# Patient Record
Sex: FEMALE | Race: WHITE | URBAN - METROPOLITAN AREA
[De-identification: names, ages, dates, MRNs, and addresses within clinical notes are randomized per-mention and may not be internally consistent; named-entity substitution may affect disease eponyms.]

---

## 2018-09-04 ENCOUNTER — OFFICE VISIT CONVERTED (OUTPATIENT)
Dept: ONCOLOGY | Facility: HOSPITAL | Age: 68
End: 2018-09-04
Attending: INTERNAL MEDICINE

## 2018-10-23 ENCOUNTER — OFFICE VISIT CONVERTED (OUTPATIENT)
Dept: ONCOLOGY | Facility: HOSPITAL | Age: 68
End: 2018-10-23
Attending: INTERNAL MEDICINE

## 2018-11-20 ENCOUNTER — OFFICE VISIT CONVERTED (OUTPATIENT)
Dept: ONCOLOGY | Facility: HOSPITAL | Age: 68
End: 2018-11-20
Attending: THORACIC SURGERY (CARDIOTHORACIC VASCULAR SURGERY)

## 2021-05-28 VITALS
SYSTOLIC BLOOD PRESSURE: 126 MMHG | TEMPERATURE: 98 F | TEMPERATURE: 98.7 F | RESPIRATION RATE: 12 BRPM | RESPIRATION RATE: 14 BRPM | DIASTOLIC BLOOD PRESSURE: 75 MMHG | HEART RATE: 79 BPM | OXYGEN SATURATION: 96 % | DIASTOLIC BLOOD PRESSURE: 69 MMHG | SYSTOLIC BLOOD PRESSURE: 120 MMHG | HEIGHT: 66 IN | BODY MASS INDEX: 40.18 KG/M2 | OXYGEN SATURATION: 97 % | HEIGHT: 66 IN | HEART RATE: 80 BPM | BODY MASS INDEX: 39.29 KG/M2 | WEIGHT: 244.44 LBS | WEIGHT: 250 LBS

## 2021-05-28 VITALS
DIASTOLIC BLOOD PRESSURE: 71 MMHG | OXYGEN SATURATION: 96 % | WEIGHT: 248.02 LBS | SYSTOLIC BLOOD PRESSURE: 142 MMHG | HEART RATE: 83 BPM | HEIGHT: 66 IN | RESPIRATION RATE: 18 BRPM | BODY MASS INDEX: 39.86 KG/M2 | TEMPERATURE: 98.3 F

## 2021-05-28 NOTE — PROGRESS NOTES
Patient: MARTHA HARDY     Acct: [id number]     Report: #[id number]  UNIT #: [id number]     : 1950    Encounter Date:2018  PRIMARY CARE: MEENA WASSERMAN  ***Signed***  --------------------------------------------------------------------------------------------------------------------  Chief Complaint      Encounter Date      Sep 4, 2018            Primary Care Provider      MEENA WASSERMAN            Referring Provider      MEENA WASSERMAN            Patient Complaint      Patient is complaining of      abnormal chest ct            VITALS      Height 5 ft 6 in / 167.64 cm      Weight 244 lbs 7 oz / 110.501942 kg      BSA 2.33 m2      BMI 39.5 kg/m2      Temperature 98.7 F / 37.06 C - Oral      Pulse 79      Respirations 12      Blood Pressure 120/69 Sitting, Left Arm      Pulse Oximetry 96%, ROOMAIR      Exhaled Nitrous Oxide Testin            HPI      The patient is a very pleasant 67 year old obese  female with remote     history of deep venous thrombosis 10 years ago who is on lifelong     anticoagulation, hypertension and hyperlipidemia here for dyspnea and abnormal     chest CT scan.             For the last 4-5 months she has had increasing dyspnea only with exertion. She     works at [employer] and has trouble going up 2-4 flights of stairs now and is very     breathless at the end of it. She denies any associated coughing, wheezing,      chest pain or hemoptysis. She denies any nausea and vomiting, fevers or chills     or headaches. She does snore but denies any excessive daytime sleepiness. She     also complains of occasional orthopnea. She does have intermittent weight gain     and intermittently has leg swelling which resolves with Lasix. The patient had a    CTPA done showing no pulmonary embolism but did show some areas of septal thic    kening and some slight ground glass opacities consistent with pulmonary edema.     She also had a moderate sized hiatal hernia and evidence of  cardiomegaly. She     has never seen a cardiologist or told she has coronary artery disease or heart     failure. She does have heartburn well controlled with Prevacid and has had a     moderate hiatal hernia for years and has never been told it needs to be     repaired. She never had pulmonary function studies or a formal echocardiogram.     She denies any nausea and vomiting, fevers or chills, headaches, diaphoresis,     hemoptysis or chest pain. She is able to perform her activities of daily living     without difficulty and denies any swollen lymph nodes or glands in her head and     neck.  She is a never smoker.             I have personally reviewed the Review of Systems, past family, social, surgical     and medical histories and I agree with the findings.            ROS      Constitutional:  Denies: Fatigue, Fever, Weight gain, Weight loss, Chills,     Insomnia, Other      Respiratory/Breathing:  Denies: Shortness of air, Wheezing, Cough, Hemoptysis,     Pleuritic pain, Other      Endocrine:  Denies: Polydipsia, Polyuria, Heat/cold intolerance, Abnorml     menstrual pattern, Diabetes, Other      Eyes:  Denies: Blurred vision, Vision Changes, Other      Ears, nose, mouth, throat:  Denies: Mouth lesions, Thrush, Throat pain,     Hoarseness, Allergies/Hay Fever, Post Nasal Drip, Headaches, Recent Head Injury,    Nose Bleeding, Neck Stiffness, Thyroid Mass, Hearing Loss, Ear Fullness, Dry     Mouth, Nasal or Sinus Pain, Dry Lips, Nasal discharge, Nasal congestion, Other      Cardiovascular:  Complains of: Dyspnea on Exertion; Denies: Palpitations,     Syncope, Claudication, Chest Pain, Wake up Gasping for air, Leg Swelling,     Irregular Heart Rate, Cyanosis, Other      Gastrointestinal:  Denies: Nausea, Constipation, Diarrhea, Abdominal pain,     Vomiting, Difficulty Swallowing, Reflux/Heartburn, Dysphagia, Jaundice,     Bloating, Melena, Bloody stools, Other      Genitourinary:  Denies: Urinary frequency,  Incontinence, Hematuria, Urgency,     Nocturia, Dysuria, Testicular problems, Other      Musculoskeletal:  Denies: Joint Pain, Joint Stiffness, Joint Swelling, Myalgias,    Other      Hematologic/lymphatic:  DENIES: Lymphadenopathy, Bruising, Bleeding tendencies,     Other      Neurological:  Denies: Headache, Numbness, Weakness, Seizures, Other      Psychiatric:  Denies: Anxiety, Appropriate Effect, Depression, Other      Sleep:  No: Excessive daytime sleep, Morning Headache?, Snoring, Insomnia?, Stop    breathing at sleep?, Other      Integumentary:  Denies: Rash, Dry skin, Skin Warm to Touch, Other      Immunologic/Allergic:  Denies: Latex allergy, Seasonal allergies, Asthma,     Urticaria, Eczema, Other      Immunization status:  No: Up to date            FAMILY/SOCIAL/MEDICAL HX      Surgical History:  Yes: Cholecystectomy, Eye Surgery (RETINA / CATARACT), Hernia    Surgery (2018), Orthopedic Surgery (L FOOT)      Stroke - Family Hx:  Brother      Heart - Family Hx:  Brother      Cancer/Type - Family Hx:  Father      Is Father Still Living?:  No      Is Mother Still Living?:  No       Family History:  Yes      Social History:  No Tobacco Use, No Alcohol Use, No Recreational Drug use      Smoking status:  Never smoker      Tubal Ligation:  Yes      Hysterectomy:  Yes      Anticoagulation Therapy:  No      Antibiotic Prophylaxis:  No      Psychiatric History      NONE            PREVENTION      Hx Influenza Vaccination:  Yes      Date Influenza Vaccine Given:  Sep 1, 2017      Influenza Vaccine Declined:  No      2 or More Falls Past Year?:  No      Fall Past Year with Injury?:  No      Hx Pneumococcal Vaccination:  No      Encouraged to follow-up with:  PCP regarding preventative exams.      Chart initiated by      CAYLA/ MA            ALLERGIES/MEDICATIONS      Allergies:        Coded Allergies:             NO KNOWN ALLERGIES (Unverified , 9/4/18)      Medications    Last Reconciled on 9/4/18 08:59 by  RODNEY BRISENO MD      Hydrocodone/Acetaminophen 10/325 MG (Hydrocodone/Acetaminophen 10/325 MG) 1 Each    Tablet      1 TAB PO Q4H PRN for BREAKTHROUGH PAIN, TAB 0 Refills         Reported         9/4/18       Gluc Lockhart/Chondro Lockhart A/Vit C/Mn (Osteo Biflex) 1 Each Tablet      1 TAB PO QDAY, #30 TAB         Reported         9/4/18       Warfarin Sod (Coumadin*) 5 Mg Tablet      5 MG PO ASDIR, #30 TAB 0 Refills         Reported         9/4/18       Pravastatin Sod (Pravastatin*) 20 Mg Tablet      20 MG PO QDAY, #30 TAB 0 Refills         Reported         9/4/18       Furosemide* (Lasix*) 40 Mg Tablet      40 MG PO QDAY, #30 TAB 0 Refills         Reported         9/4/18       amLODIPine (amLODIPine) 5 Mg Tablet      5 MG PO QDAY, #30 TAB         Reported         9/4/18       Omeprazole (Omeprazole*) 20 Mg Tablet.dr      20 MG PO QDAY, #30 CAP 0 Refills         Reported         9/4/18       Multivitamins (Multi-Vitamin) 1 Each Tablet      1 TAB PO QDAY, #30 TAB 0 Refills         Reported         9/4/18      Current Medications      Current Medications Reviewed 9/4/18            EXAM      Vital Signs Reviewed      Gen: WDWN, Alert, NAD.        HEENT:  PERRL, EOMI.  OP, nares clear, no sinus tenderness.      Neck:  Supple, no JVD, no thyromegaly.      Lymph: No axillary, cervical, supraclavicular lymphadenopathy noted bilaterally.      Chest:  Good aeration, clear to auscultation bilaterally, tympanic to percussion    bilaterally, no work of breathing noted.      CV:  RRR, no MGR, pulses 2+, equal.      Abd:  Soft, NT, ND, + BS, no HSM. Obese.        EXT:  No clubbing, no cyanosis, trace bilateral lower extremity  edema, no joint    tenderness.       Neuro:  A  Skin: No rashes or lesions.      Vtials      Vitals:             Height 5 ft 6 in / 167.64 cm           Weight 244 lbs 7 oz / 110.520329 kg           BSA 2.33 m2           BMI 39.5 kg/m2           Temperature 98.7 F / 37.06 C - Oral           Pulse 79            Respirations 12           Blood Pressure 120/69 Sitting, Left Arm           Pulse Oximetry 96%, ROOMAIR            REVIEW      Results Reviewed      P[facility]S Results Reviewed?:  Yes Prev Lab Results, Yes Prev Radiology Results, Yes     Previous [facility] Records (I personally reviewed the patient's office notes from     her referring provider.  )      Lab Results      I personally reviewed the patient's labs showing normal leukocytosis with no     evidence of CO2 retention.      Radiographic Results      I personally reviewed the patient's chest CT scan from [facility] showing a     moderate sized hiatal hernia, cardiomegaly and slight areas of septal thickening    with trace areas of ground glass concerning for pulmonary edema.            Assessment      COUCH (dyspnea on exertion) - R06.09            Anticoagulant long-term use - Z79.01            History of DVT of lower extremity - Z86.718            Obesity (BMI 30-39.9) - E66.9            Cardiomegaly - I51.7            Abnormal chest CT - R93.8            Notes      New Medications      * MULTIVITAMINS (Multi-Vitamin) 1 EACH TABLET: 1 TAB PO QDAY #30      * Omeprazole (Omeprazole*) 20 MG TABLET.DR: 20 MG PO QDAY #30      * amLODIPine 5 MG TABLET: 5 MG PO QDAY #30      * Furosemide* (Lasix*) 40 MG TABLET: 40 MG PO QDAY #30      * Pravastatin Sod (Pravastatin*) 20 MG TABLET: 20 MG PO QDAY #30      * Warfarin Sod (Coumadin*) 5 MG TABLET: 5 MG PO ASDIR #30      * Gluc Lockhart/Chondro Lockhart A/Vit C/Mn (Osteo Biflex) 1 EACH TABLET: 1 TAB PO QDAY #30      * Hydrocodone/Acetaminophen 10/325 MG 1 EACH TABLET: 1 TAB PO Q4H PRN       BREAKTHROUGH PAIN      New Diagnostics      * 6 Min Walk With Pulse Ox, Routine         Dx: COUCH (dyspnea on exertion) - R06.09      * Echo Complete, Week         Dx: COUCH (dyspnea on exertion) - R06.09      * Ekg Std Twelve 34232 HM, As Soon As Possible         Dx: COUCH (dyspnea on exertion) - R06.09      * PFT-Comp, PrePost,DLCO,BodyBox, Week         Dx: COUCH  (dyspnea on exertion) - R06.09      * Chest W/O Cont High Resolution, SCHEDULED PROCEDURE         Dx: COUCH (dyspnea on exertion) - R06.09      * CBC, Month         Dx: COUCH (dyspnea on exertion) - R06.09      * Comp Metabolic Panel, Month         Dx: COUCH (dyspnea on exertion) - R06.09      * Probrain Natriuretic, Routine         Dx: COUCH (dyspnea on exertion) - R06.09      New Office Procedures      * Prevnar 0.5 PCV13, As Soon As Possible         Pneumoc 13-Ro Conj-Dip CRm/Pf (Prevnar 13 Syringe) 0.5 ML SYRINGE: 0.5        MILLILITER INTRAMUSCULARLY Qty 1 SYRINGE         Dx: COUCH (dyspnea on exertion) - R06.09      IMPRESSION:      1. Dyspnea on exertion.      2. Abnormal chest CT scan concerning for possible pulmonary edema.       3. Cardiomegaly.       4. History of deep venous thrombosis of lower extremity remotely over 10 years     ago.       5. Anticoagulation long term use.       6. Obesity with BMI 39.5.            PLAN:      1. I performed exhaled nitrous oxide testing in the office today.  Her level is     27 indicative of a moderate degree of eosinophilic airway inflammation.       2. After reviewing the chest CT scan and reviewing the patient's history, I     think that the findings on her chest CT scan may be related to pulmonary edema     given her cardiomegaly, hypertension and other comorbidities.       3. We will send the patient for EKG and echocardiogram.       4. Check pulmonary function tests and six minute walk test.       5. Check CBC, comprehensive metabolic profile and NT-Pro BNP.      6. I advised her to continue her antihypertensives, statin and her Lasix.       7. The patient has had 1 isolated deep venous thrombosis over 10 years ago and     should have only received 6 months of anticoagulation. This patient has been on     Coumadin for over 10 years. I discussed with her that based off of [employer]     [employer] Guidelines she should be able to stop this. She will     discuss this further with her primary care provider as they prescribe this and I    will defer to them in this regard.       8. I spent 4 minutes today counseling the patient diet and exercise.  I     discussed low salt diet of 2 grams a day and fluid restriction of 2 liters a day    as well as 30 minutes of daily exercise.  The patient verbalized understanding.       9.  We will check a high resolution noncontrast chest CT scan to further     elucidate this pulmonary edema and make sure there is not other etiology such as    hypersensitivity pneumonitis playing a role however, I find this unlikely as the    patient has no other risk factors for interstitial lung disease.       10. Up to date with flu vaccine, we will give her Prevnar today and Pneumovax in    1 year.       11. Follow up with me in 1-2 months to discuss test results.            Patient Education      ACO BMI High above 25:  Counseling Given, Encouraged weight loss, Encourage     dietary changes      Other Education:  ?  CHF                 Disclaimer: Converted document may not contain table formatting or lab diagrams. Please see [employer] for the authenticated document.

## 2021-05-28 NOTE — PROGRESS NOTES
Patient: MARTHA HARDY     Acct: [id number]     Report: #[id number]  UNIT #: [id number]     : 1950    Encounter Date:10/23/2018  PRIMARY CARE: MEENA WASSERMAN  ***Signed***  --------------------------------------------------------------------------------------------------------------------  Chief Complaint      Encounter Date      Oct 23, 2018            Primary Care Provider      MEENA WASSERMAN            Referring Provider      MEENA WASSERMAN            Patient Complaint      Patient is complaining of      f/u chest ct results            VITALS      Height 5 ft 6 in / 167.64 cm      Weight 250 lbs 0 oz / 113.3981 kg      BSA 2.20 m2      BMI 40.4 kg/m2      Temperature 98.0 F / 36.67 C - Oral      Pulse 80      Respirations 14      Blood Pressure 126/75 Sitting, Left Arm      Pulse Oximetry 97%, roomair      Exhaled Nitrous Oxide Testin            HPI      The patient is a very pleasant 68 year old  morbidly obese female with     remote history of deep venous thrombosis 10 years ago here for follow up.             Since her last office visit I thought she was volume overloaded originally and I    made some adjustments to her Lasix and Coreg. She had CBC, comprehensive     metabolic profile and basic metabolic panel done all of which were normal. CT     scan was normal with the exception of a moderate hiatal hernia. Pulmonary     function tests showed mild airflow obstruction with FEV1 78% predicted with no     bronchodilator response. Since her last office visit she feels slightly better,     she still gets dyspnea on exertion at work as she works for [employer].  She gets     short of breath with doing moderate to heavy exertion walking up 3-4 flights of     stairs that is relieved after about 3-4 minutes of rest. She denies any     wheezing, coughing, chest pain or hemoptysis.  She denies any nausea and     vomiting, fevers or chills or headaches. She notes no changes in her weight. She    does  have significant symptoms of acid reflux. She takes Omeprazole 20 mg daily     and still wakes up throughout the night with heartburn and constantly has an     acid taste in the back of her throat.  She has had significant issues the last     couple of week where she has been increasing over her Omeprazole dosage and     taking peppermint. She occasionally vomits with no bloody emesis. She sometimes     wakes up coughing at night gay she lies flat and sometimes coughs after meals.     She states she has been told she has a hiatal hernia before and it was small and    it could not be repaired. She denies any other nausea and vomiting, fevers or     chills, headaches or hemoptysis or chest pain. She is able to perform her     activities of daily living without difficulty and denies any swollen lymph nodes    or glands in her head and neck.             I have personally reviewed the Review of Systems, past family, social, surgical     and medical histories and I agree with the findings.            ROS      Constitutional:  Denies: Fatigue, Fever, Weight gain, Weight loss, Chills,     Insomnia, Other      Respiratory/Breathing:  Complains of: Shortness of air, Wheezing, Cough; Denies:    Hemoptysis, Pleuritic pain, Other      Endocrine:  Denies: Polydipsia, Polyuria, Heat/cold intolerance, Abnorml     menstrual pattern, Diabetes, Other      Eyes:  Denies: Blurred vision, Vision Changes, Other      Ears, nose, mouth, throat:  Denies: Mouth lesions, Thrush, Throat pain,     Hoarseness, Allergies/Hay Fever, Post Nasal Drip, Headaches, Recent Head Injury,    Nose Bleeding, Neck Stiffness, Thyroid Mass, Hearing Loss, Ear Fullness, Dry     Mouth, Nasal or Sinus Pain, Dry Lips, Nasal discharge, Nasal congestion, Other      Cardiovascular:  Denies: Palpitations, Syncope, Claudication, Chest Pain, Wake     up Gasping for air, Leg Swelling, Irregular Heart Rate, Cyanosis, Dyspnea on     Exertion, Other      Gastrointestinal:   Denies: Nausea, Constipation, Diarrhea, Abdominal pain,     Vomiting, Difficulty Swallowing, Reflux/Heartburn, Dysphagia, Jaundice,     Bloating, Melena, Bloody stools, Other      Genitourinary:  Denies: Urinary frequency, Incontinence, Hematuria, Urgency,     Nocturia, Dysuria, Testicular problems, Other      Musculoskeletal:  Denies: Joint Pain, Joint Stiffness, Joint Swelling, Myalgias,    Other      Hematologic/lymphatic:  DENIES: Lymphadenopathy, Bruising, Bleeding tendencies,     Other      Neurological:  Denies: Headache, Numbness, Weakness, Seizures, Other      Psychiatric:  Denies: Anxiety, Appropriate Effect, Depression, Other      Sleep:  No: Excessive daytime sleep, Morning Headache?, Snoring, Insomnia?, Stop    breathing at sleep?, Other      Integumentary:  Denies: Rash, Dry skin, Skin Warm to Touch, Other      Immunologic/Allergic:  Denies: Latex allergy, Seasonal allergies, Asthma,     Urticaria, Eczema, Other      Immunization status:  No: Up to date            FAMILY/SOCIAL/MEDICAL HX      Surgical History:  Yes: Cholecystectomy, Eye Surgery (RETINA / CATARACT), Hernia    Surgery (2018), Orthopedic Surgery (L FOOT)      Stroke - Family Hx:  Brother      Heart - Family Hx:  Brother      Cancer/Type - Family Hx:  Father      Is Father Still Living?:  No      Is Mother Still Living?:  No       Family History:  Yes      Social History:  No Tobacco Use, No Alcohol Use, No Recreational Drug use      Smoking status:  Never smoker      Tubal Ligation:  Yes      Hysterectomy:  Yes      Anticoagulation Therapy:  No      Antibiotic Prophylaxis:  No      Medical History:  No: Sinus Trouble      Psychiatric History      none            PREVENTION      Hx Influenza Vaccination:  Yes      Date Influenza Vaccine Given:  Oct 23, 2018      Influenza Vaccine Declined:  No      2 or More Falls Past Year?:  No      Fall Past Year with Injury?:  No      Hx Pneumococcal Vaccination:  No      Encouraged to follow-up with:   PCP regarding preventative exams.      Chart initiated by      timi turpin/ ma            ALLERGIES/MEDICATIONS      Allergies:        Coded Allergies:             NO KNOWN ALLERGIES (Unverified , 10/23/18)      Medications    Last Reconciled on 10/23/18 09:08 by RODNEY BRISENO MD      Hydrocodone/Acetaminophen 10/325 MG (Hydrocodone/Acetaminophen 10/325 MG) 1 Each    Tablet      1 TAB PO Q4H PRN for BREAKTHROUGH PAIN, TAB 0 Refills         Reported         9/4/18       Gluc Lockhart/Chondro Lockhart A/Vit C/Mn (Osteo Biflex) 1 Each Tablet      1 TAB PO QDAY, #30 TAB         Reported         9/4/18       Warfarin Sod (Coumadin*) 5 Mg Tablet      5 MG PO ASDIR, #30 TAB 0 Refills         Reported         9/4/18       Pravastatin Sod (Pravastatin*) 20 Mg Tablet      20 MG PO QDAY, #30 TAB 0 Refills         Reported         9/4/18       Furosemide* (Lasix*) 40 Mg Tablet      40 MG PO QDAY, #30 TAB 0 Refills         Reported         9/4/18       amLODIPine (amLODIPine) 5 Mg Tablet      5 MG PO QDAY, #30 TAB         Reported         9/4/18       Omeprazole (Omeprazole*) 20 Mg Tablet.dr      20 MG PO QDAY, #30 CAP 0 Refills         Reported         9/4/18       Multivitamins (Multi-Vitamin) 1 Each Tablet      1 TAB PO QDAY, #30 TAB 0 Refills         Reported         9/4/18      Current Medications      Current Medications Reviewed 10/23/18            EXAM      Vital Signs Reviewed      Gen: WDWN, Alert, NAD.        HEENT:  PERRL, EOMI.  OP, nares clear, no sinus tenderness.      Chest:  Good aeration, clear to auscultation bilaterally, tympanic to percussion    bilaterally, no work of breathing noted.      CV:  RRR, no MGR, pulses 2+, equal.      Abd:  Soft, NT, ND, + BS, no HSM. Obese.        EXT:  No clubbing, no cyanosis, trace bilateral lower extremity edema, no joint     tenderness.       Neuro:  A  Skin: No rashes or lesions.      Vtials      Vitals:             Height 5 ft 6 in / 167.64 cm           Weight 250 lbs 0 oz /  113.3981 kg           BSA 2.20 m2           BMI 40.4 kg/m2           Temperature 98.0 F / 36.67 C - Oral           Pulse 80           Respirations 14           Blood Pressure 126/75 Sitting, Left Arm           Pulse Oximetry 97%, roomair            REVIEW      Results Reviewed      PCCS Results Reviewed?:  Yes Prev Lab Results, Yes Prev Radiology Results, Yes     Previous Mecial Records      Lab Results      I personally reviewed the patient's EKG showing left axis deviation but     otherwise normal sinus rhythm with no ST changes. I personally reviewed the     patient's CBC, comprehensive metabolic profile and NT-Pro BNP all of which were     unremarkable.      Micro Results      0920-0070  [id number]                                 [facility]                          Health Information Management Services                            [place]  [id number]               __________________________________________________________________________             Patient Name:                   Attending Physician:      Sivan Owen M.D.             Patient Visit # MR #            Admit Date  Disch Date     Location      [id number]    [id number]      09/20/2018                 [facility] -             Date of Birth      1950      __________________________________________________________________________      0835 - DIAGNOSTIC REPORT             2-D AND M-MODE ECHOCARDIOGRAM REPORT             DATE:  09/20/2018             INDICATION:                                    NORMAL RANGE                TEST RESULTS      _____________________________________________________________________                                                   Systolic       Diastolic      RVID                    0.7-2.4      LVID                    3.7-5.4          3.9            5.3      POST. WALL THICKNESS    0.8-1.1                         1.1      SEPTAL WALL THICKNESS    0.7-1.2                         1.1      LAID                    1.9-3.8                         2.5      AORTIC ROOT DIMENSION   2.0-3.7      MTRL. VLV. JULES. D.D.R. 80mm/sec-150mm/sec      _____________________________________________________________________             COMMENTS:  The patient underwent 2-D, M-Mode, and Doppler echocardiogram.      The study was technically adequate.  The following was observed.             1.  MITRAL VALVE:       Normal.      2.  AORTIC VALVE:       Normal.      3.  TRICUSPID VALVE:    Normal.      4.  PULMONIC VALVE:     Not well visualized.      5.  AORTIC ROOT:        Normal in size.      6.  LEFT ATRIUM:        Normal.      7.  LEFT VENTRICLE:     Normal in size. Overall left ventricular systolic          function is adequate, ejection fraction around 60%.      8.  RIGHT VENTRICLE:    Normal.      9.  RIGHT ATRIUM:       Normal.      10. PERICARDIUM:        Unremarkable.             Doppler exam showed decreased diastolic compliance of the left ventricle.             CONCLUSION:      1.  Normal left ventricular systolic function.      2.  Decreased diastolic compliance of the left ventricle.      3.  No significant valvular abnormalities noted.             To be electronically signed in [employer]      [id number] MARGARET CHIANG M.D.             KRISTI:      D:  2018 08:31      T:  2018 08:52      #[id number]             Until signed, this is an unconfirmed preliminary report that may contain      errors and is subject to change.                   Radiographic Results               [facility]                PACS RADIOLOGY REPORT            Patient: MARTHA HARDY   Acct: #[id number]   Report: #[id number]            UNIT #: [id number]    DOS: 18 0805      INSURANCE:[employer]   ORDER #:[id number]      LOCATION:[facility]     : 1950            PROVIDERS      ADMITTING:     ATTENDING: RODNEY BRISENO       FAMILY:  MEENA WASSERMAN   ORDERING:  RODNEY BRISENO         OTHER:    DICTATING:  Abdoulaye Min MD            REQ #:[id number]   EXAM:CHWO - CT CHEST without CONTRAST      REASON FOR EXAM:  DYSPNEA      REASON FOR VISIT:  DYSPNEA            *******Signed******         PROCEDURE:   CT CHEST WITHOUT CONTRAST             COMPARISON:   None.             INDICATIONS:   DYSPNEA             TECHNIQUE:   CT images were created without the administration of contrast     material.               PROTOCOL:     High Resolution Protocol                RADIATION:     DLP: 788mGy*cm          Automated exposure control was utilized to minimize radiation dose.              FINDINGS:         Mild biapical pleural-parenchymal scarring.  Mild linear scarring     and/atelectasis in the base of       the right middle lobe, lingula, and left lower lobe.  Lungs are otherwise clear.     No concerning       pulmonary nodule.  No significant interstitial lung disease.  No honeycombing.      No evidence of air       trapping.  The central airways are widely patent.  No pneumothorax or pleural     effusion.             The heart and great vessels of the chest are normal in size.  Calcified coronary    artery disease.        No pericardial effusion.  No pathologically enlarged intrathoracic lymph nodes     are identified.        Moderate hiatal hernia.             Limited noncontrast CT imaging of the upper abdomen demonstrates a nonspecific     incompletely       evaluated slightly high attenuation 1 cm lesion in the right kidney, probable     proteinaceous or       hemorrhagic cyst.  Colonic diverticula.             Diffuse osteopenia.  The thoracic dextroscoliosis and spondylosis.  No acute     osseous abnormality or       concerning osseous lesion.             CONCLUSION:         1. No acute intrathoracic abnormality.  No significant interstitial lung     disease.      2. Calcified coronary artery disease.      3. Moderate hiatal hernia.       4. Incompletely evaluated slightly high attenuation 1 cm lesion in the right     kidney, probable       proteinaceous or hemorrhagic cyst.  Could consider further evaluation with     ultrasound or       contrast-enhanced with MRI or CT if clinically indicated.              KOJO BURKETT MD             Electronically Signed and Approved By: KOJO BURKETT MD on 9/20/2018 at 8:48                        Until signed, this is an unconfirmed preliminary report that may contain      errors and is subject to change.                                              COLOT:      D:09/20/18 0848      PFT Results      [id number]  [id number]                                 [facility]                          Health Information Management Services                            [place], [place]  [phone]               __________________________________________________________________________             Patient Name:                   Attending Physician:      Sivan Owen M.D.             Patient Visit # MR #            Admit Date  Disch Date     Location      [id number]    [id number]      09/20/2018                 [facility] -             Date of Birth      1950      __________________________________________________________________________      821 - DIAGNOSTIC REPORT             PULMONARY FUNCTION TEST:             DATE:      09/20/2018.             SPIROMETRY:      Mild airflow obstruction present.  While the FEV1/FVC ratio is normal at 78%,      the forced vital capacity is reduced at 75%.  Total lung capacity is normal.      FEV1 2.06/77%.  No bronchodilator response is noted.  Flow volume loop      suggests obstruction.             LUNG VOLUMES:      No evidence of hyperinflation or air trapping.             DIFFUSION CAPACITY:      Diffusion capacity within normal limits.             OVERALL IMPRESSION:      Mild airflow obstruction with no bronchodilator  response present.  No      evidence of hyperinflation or air trapping present.  Diffusion capacity      within normal limits.             To be electronically signed in [employer]      [id number] RODNEY BRISENO M.D.             AM:kary      D:  09/21/2018 16:03      T:  09/21/2018 16:08      #[id number]             Until signed, this is an unconfirmed preliminary report that may contain      errors and is subject to change.                   09/22/18 0657  <Electronically signed by RODNEY BRISENO MD>            Assessment      Obesity (BMI 30-39.9) - E66.9            Hiatal hernia - K44.9            Morbid obesity with BMI of 40.0-44.9, adult - E66.01, Z68.41            GERD without esophagitis - K21.9            Notes      New Office Procedures      * Fluzone Vaccine High-Dose, As Soon As Possible         Flu Vacc [id number](65Yr Up)/Pf (Fluzone High-Dose 2018-19 Syringe) 180        MCG/0.5 ML SYRINGE: 180 MICROGRAM INTRAMUSCULARLY Qty 1 SYRINGE      New Referrals      * Thoracic Surgery         FIFI SHIPLEY with [facility]         Status changed from Active to Complete.         Dx: Obesity (BMI 30-39.9) - E66.9      IMPRESSION:      1. Dyspnea on exertion unchanged.       2. Chronic compensated diastolic heart failure, new diagnosis.       3. Gastrointestinal esophageal reflux disease without esophagitis poorly     controlled.       4. Moderate hiatal hernia.       5. History of deep venous thrombosis 10 years ago on chronic anticoagulation.       6. Morbid obesity with BMI 40.4.            PLAN:      1. Dyspnea is likely related to a combination of diastolic heart failure,     obesity and hiatal hernia.       2. She does have mild airflow obstruction on pulmonary function tests. I     discussed starting the patient on inhalers to see if this would help her     respiratory status. The patient states that it improves after resting for a     minute or 2 and feels she does not need any inhalers at this time. I think  this     is reasonable.      3. Echocardiogram and EKG confirm diastolic dysfunction. Continue beta blocker     and Lasix and antihypertensives. Blood pressure is currently well controlled and    appears euvolemic today.       4. I informed the patient that from my perspective she can stop anticoagulation     for isolated deep venous thrombosis over a decade ago. She will defer this to     her primary care provider.      5. Continue Omeprazole and other over the counter agents for acid reflux.       6. Her enlarging hiatal hernia appears to be a problem. It is likely causing     some respiratory symptoms as well. I will refer the patient to Dr. Ermias Rabago    to evaluate for hiatal hernia repair. I did inform her that she will need to     lose weight before he would likely consider her to be an operative candidate.     The patient verbalized understanding and will make attempts to lose weight.       7. I spent 6 minutes today counseling the patient diet and exercise. I discussed    30-60 minutes of daily exercise as well as a 1800 calorie a day low fat diet.     The patient verbalized understanding and will make attempts to do so.       8. The patient is up to date with Prevnar. She will need Pneumovax in July 2019     which is 1 year after her Prevnar and we will give her a Fluzone vaccine today.       9.  Follow up with me in 4 months.            Patient Education      ACO BMI High above 25:  Counseling Given, Encouraged weight loss, Encourage di    etary changes            Patient Education:        Hiatal Hernia      Other Education:  CT results. CHF                 Disclaimer: Converted document may not contain table formatting or lab diagrams. Please see [employer] for the authenticated document.

## 2021-05-28 NOTE — PROGRESS NOTES
"Patient: MARTHA OWEN     Acct: [id number]     Report: #[id number]  UNIT #: [id number]     : 1950    Encounter Date:2018  PRIMARY CARE: MEENA WASSERMAN  ***Signed***  --------------------------------------------------------------------------------------------------------------------  Encounter Date      2018      hernia            History of Present Illness      Ms. Owen is a pleasant 68-year-old female who presents to the general     thoracic surgical office today for evaluation of a type III paraesophageal he    rnia.  She was referred from Dr. Patel of pulmonology.  She follows with Dr. Patel for workup and evaluation of increasing dyspnea on exertion she states     that recently she began a walking program but has noticed that she is becoming     increasingly short of breath with activity.  He performed a workup in the office    to include pulmonary function test as well as echocardiogram.  In regards to her    paraesophageal hernia she states she has a long-standing history of reflux for     which she takes Prilosec once a day.  Over the recent past her gastroesophageal     reflux disease has worsened and she mainly notices it at night.  She wakes up     with a \"burning sensation\".  She also complains of a water brash sensation that     she describes as \"hurting her teeth\".  She specifically denies nausea, emesis,     odynophagia.  As stated above her dyspnea on exertion is worsening and she has     noticed this to be associated with increasing fatigue.  She does have a history     of deep vein thrombosis associated with 1 of her surgical procedures a    pproximately 10 years ago for which she continues to take Coumadin            Past surgical history: Open cholecystectomy, tubal ligation, left foot surgery,     hysterectomy, ventral hernia repair            Allergies      Coded Allergies:             NO KNOWN ALLERGIES (Unverified , 18)            Yes: Appendectomy, " Cholecystectomy, Eye Surgery (RETINA / CATARACT), Hernia     Surgery (2018), Orthopedic Surgery (L FOOT)      Stroke - Family Hx:  Brother      Heart - Family Hx:  Brother      Cancer/Type - Family Hx:  Father      Is Father Still Living?:  No      Is Mother Still Living?:  No      Social History:  No Tobacco Use, No Alcohol Use, No Recreational Drug use      Smoking status:  Never smoker      Medical History:  No: Sinus Trouble            Medications      Last Reconciled on 11/20/18 09:58 by FIFI SHIPLEY,       Hydrocodone/Acetaminophen 10/325 MG (Hydrocodone/Acetaminophen 10/325 MG) 1 Each    Tablet      1 TAB PO Q4H PRN for BREAKTHROUGH PAIN, TAB 0 Refills         Reported         9/4/18       Warfarin Sod (Coumadin*) 5 Mg Tablet      5 MG PO ASDIR, #30 TAB 0 Refills         Reported         9/4/18       Pravastatin Sod (Pravastatin*) 20 Mg Tablet      20 MG PO QDAY, #30 TAB 0 Refills         Reported         9/4/18       Furosemide* (Lasix*) 40 Mg Tablet      40 MG PO QDAY, #30 TAB 0 Refills         Reported         9/4/18       amLODIPine (amLODIPine) 5 Mg Tablet      5 MG PO QDAY, #30 TAB         Reported         9/4/18       Omeprazole (Omeprazole*) 20 Mg Tablet.dr      20 MG PO QDAY, #30 CAP 0 Refills         Reported         9/4/18       Multivitamins (Multi-Vitamin) 1 Each Tablet      1 TAB PO QDAY, #30 TAB 0 Refills         Reported         9/4/18            Vitals      Height 5 ft 6.00 in / 167.64 cm      Weight 248 lbs 0.280 oz / 112.5 kg      BSA 2.19 m2      BMI 40.0 kg/m2      Temperature 98.3 F / 36.83 C - Temporal      Pulse 83      Respirations 18      Blood Pressure 142/71 Sitting, Right Arm      Pulse Oximetry 96%, rm air            General Appearance:  Alert, Oriented X3      HEENT:  Orophraynx clear, No Erythema, No Exudates      Neck:  Supple, No Masses or JVD      Respiratory:  CTAB      Abdomen:  Soft, No NABS, No Masses, No Hernias, No Hepatosplenomegaly (Morbidly     obese, scars  consistent with previously stated surgery)      Cardiovascular:  No Chest Tenderness; Regular Rate and Rhythm      Lymphatic:  No Neck      Extremeties:  Pulses Positive all 4 Ext      Neurological:  Mental Status WNL, Alert+Ox3      Musculoskeletal:  Normal Bulk Strength      Skin:  No Rash, No Cellulitis      Psychiatric:  Appropriate Affect            Imaging/Interpretation      I personally reviewed the CT of the thorax:Mild biapical pleural-parenchymal     scarring.  Mild linear scarring and/atelectasis in the base of       the right middle lobe, lingula, and left lower lobe.  Lungs are otherwise clear.     No concerning       pulmonary nodule.  No significant interstitial lung disease.  No honeycombing.      No evidence of air       trapping.  The central airways are widely patent.  No pneumothorax or pleural     effusion.             The heart and great vessels of the chest are normal in size.  Calcified coronary    artery disease.        No pericardial effusion.  No pathologically enlarged intrathoracic lymph nodes     are identified.        Moderate hiatal hernia.             Limited noncontrast CT imaging of the upper abdomen demonstrates a nonspecific     incompletely       evaluated slightly high attenuation 1 cm lesion in the right kidney, probable     proteinaceous or       hemorrhagic cyst.  Colonic diverticula.             Diffuse osteopenia.  The thoracic dextroscoliosis and spondylosis.  No acute     osseous abnormality or       concerning osseous lesion.            PFT Results      FEV1 78% of predicted            Other Results      Echocardiogram shows a normal left ventricular systolic function, decreased     diastolic compliance of the left ventricle and no significant valvular     abnormalities            Notes      New Referrals      * Diet Non Diabetic, As Soon As Possible         Dx: Overweight - E66.3      Ms. Owen presents today for a new patient evaluation of a type III      symptomatic paraesophageal hernia.  We had a adria and candid discussion     regarding the nature of paraesophageal hernias and gastroesophageal reflux     disease.  At this point in time giving her BMI of 40 she does not qualify for     traditional laparoscopic approach and repair of her paraesophageal hernia due to    the high failure rate.  We counseled her that at this time the optimal surgical     management would be with bariatric surgery and concomitant repair of her     paraesophageal hernia.  At this point in time she does not wish to pursue this     option.  We will refer her to a dietitian and have counseled her on weight loss     and lifestyle management including increasing her activity.  We will plan to see    her back in 6 months to assess her progress with weight loss and continue to     follow her gastroesophageal reflux disease and paraesophageal hernia            PREVENTION      Hx Influenza Vaccination:  Yes      Date Influenza Vaccine Given:  Oct 23, 2018      Influenza Vaccine Declined:  No      2 or More Falls Past Year?:  No      Fall Past Year with Injury?:  No      Hx Pneumococcal Vaccination:  No      Encouraged to follow-up with:  PCP regarding preventative exams.      Chart initiated by      emily leggett ma                 Disclaimer: Converted document may not contain table formatting or lab diagrams. Please see [employer] for the authenticated document.